# Patient Record
Sex: FEMALE | URBAN - METROPOLITAN AREA
[De-identification: names, ages, dates, MRNs, and addresses within clinical notes are randomized per-mention and may not be internally consistent; named-entity substitution may affect disease eponyms.]

---

## 2024-07-25 VITALS — HEART RATE: 83 BPM | RESPIRATION RATE: 16 BRPM | WEIGHT: 189.82 LBS | HEIGHT: 66 IN

## 2024-07-25 NOTE — ASU PATIENT PROFILE, ADULT - NSICDXPASTMEDICALHX_GEN_ALL_CORE_FT
PAST MEDICAL HISTORY:  Anxiety and depression     History of Graves' disease     IBS (irritable bowel syndrome) IBS-D    Malignant neoplasm of thyroid gland

## 2024-07-26 ENCOUNTER — INPATIENT (INPATIENT)
Facility: HOSPITAL | Age: 24
LOS: 1 days | Discharge: HOME | End: 2024-07-28
Attending: SPECIALIST | Admitting: SPECIALIST
Payer: COMMERCIAL

## 2024-07-26 DIAGNOSIS — Z90.89 ACQUIRED ABSENCE OF OTHER ORGANS: Chronic | ICD-10-CM

## 2024-07-26 LAB
CALCIUM SERPL-MCNC: 8.2 MG/DL — LOW (ref 8.4–10.5)
PTH-INTACT IO % DIF SERPL: 21.5 PG/ML — SIGNIFICANT CHANGE UP (ref 15–65)
PTH-INTACT IO % DIF SERPL: 58.3 PG/ML — SIGNIFICANT CHANGE UP (ref 15–65)

## 2024-07-26 PROCEDURE — 88307 TISSUE EXAM BY PATHOLOGIST: CPT | Mod: 26

## 2024-07-26 PROCEDURE — 60252 REMOVAL OF THYROID: CPT

## 2024-07-26 DEVICE — SURGCEL 4 X 8": Type: IMPLANTABLE DEVICE | Status: FUNCTIONAL

## 2024-07-26 DEVICE — CLIP APPLIER ETHICON LIGACLIP 9 3/8" SMALL: Type: IMPLANTABLE DEVICE | Status: FUNCTIONAL

## 2024-07-26 DEVICE — CLIP APPLIER ETHICON LIGACLIP 11.5" MEDIUM: Type: IMPLANTABLE DEVICE | Status: FUNCTIONAL

## 2024-07-26 RX ORDER — BENZOCAINE AND MENTHOL 5; 1 G/100ML; G/100ML
3 LIQUID ORAL THREE TIMES A DAY
Refills: 0 | Status: DISCONTINUED | OUTPATIENT
Start: 2024-07-26 | End: 2024-07-28

## 2024-07-26 RX ORDER — LEVOTHYROXINE SODIUM 175 MCG
88 TABLET ORAL DAILY
Refills: 0 | Status: DISCONTINUED | OUTPATIENT
Start: 2024-07-27 | End: 2024-07-28

## 2024-07-26 RX ORDER — DEXAMETHASONE 1.5 MG/1
10 TABLET ORAL EVERY 8 HOURS
Refills: 0 | Status: COMPLETED | OUTPATIENT
Start: 2024-07-26 | End: 2024-07-27

## 2024-07-26 RX ORDER — HYDROMORPHONE HCL IN 0.9% NACL 0.2 MG/ML
0.2 PLASTIC BAG, INJECTION (ML) INTRAVENOUS ONCE
Refills: 0 | Status: DISCONTINUED | OUTPATIENT
Start: 2024-07-26 | End: 2024-07-26

## 2024-07-26 RX ORDER — ACETAMINOPHEN 500 MG
1000 TABLET ORAL EVERY 6 HOURS
Refills: 0 | Status: DISCONTINUED | OUTPATIENT
Start: 2024-07-26 | End: 2024-07-28

## 2024-07-26 RX ORDER — ACETAMINOPHEN 500 MG
650 TABLET ORAL EVERY 6 HOURS
Refills: 0 | Status: DISCONTINUED | OUTPATIENT
Start: 2024-07-26 | End: 2024-07-26

## 2024-07-26 RX ORDER — IBUPROFEN 200 MG
600 TABLET ORAL EVERY 6 HOURS
Refills: 0 | Status: DISCONTINUED | OUTPATIENT
Start: 2024-07-26 | End: 2024-07-26

## 2024-07-26 RX ORDER — GUAIFENESIN 100 MG/5ML
600 SOLUTION ORAL EVERY 12 HOURS
Refills: 0 | Status: DISCONTINUED | OUTPATIENT
Start: 2024-07-26 | End: 2024-07-27

## 2024-07-26 RX ORDER — IPRATROPIUM BROMIDE AND ALBUTEROL SULFATE 2.5; .5 MG/3ML; MG/3ML
3 SOLUTION RESPIRATORY (INHALATION) ONCE
Refills: 0 | Status: COMPLETED | OUTPATIENT
Start: 2024-07-26 | End: 2024-07-26

## 2024-07-26 RX ORDER — DEXTROSE MONOHYDRATE, SODIUM CHLORIDE, SODIUM LACTATE, CALCIUM CHLORIDE, MAGNESIUM CHLORIDE 1.5; 538; 448; 18.4; 5.08 G/100ML; MG/100ML; MG/100ML; MG/100ML; MG/100ML
1000 SOLUTION INTRAPERITONEAL
Refills: 0 | Status: DISCONTINUED | OUTPATIENT
Start: 2024-07-26 | End: 2024-07-28

## 2024-07-26 RX ORDER — OXYCODONE HYDROCHLORIDE 30 MG/1
5 TABLET ORAL EVERY 6 HOURS
Refills: 0 | Status: DISCONTINUED | OUTPATIENT
Start: 2024-07-26 | End: 2024-07-26

## 2024-07-26 RX ORDER — ACETAMINOPHEN 500 MG
1000 TABLET ORAL ONCE
Refills: 0 | Status: COMPLETED | OUTPATIENT
Start: 2024-07-26 | End: 2024-07-26

## 2024-07-26 RX ORDER — HYDROMORPHONE HCL IN 0.9% NACL 0.2 MG/ML
0.25 PLASTIC BAG, INJECTION (ML) INTRAVENOUS EVERY 6 HOURS
Refills: 0 | Status: DISCONTINUED | OUTPATIENT
Start: 2024-07-26 | End: 2024-07-27

## 2024-07-26 RX ORDER — HYDROMORPHONE HCL IN 0.9% NACL 0.2 MG/ML
0.5 PLASTIC BAG, INJECTION (ML) INTRAVENOUS ONCE
Refills: 0 | Status: DISCONTINUED | OUTPATIENT
Start: 2024-07-26 | End: 2024-07-26

## 2024-07-26 RX ORDER — DEXTROSE MONOHYDRATE, SODIUM CHLORIDE, SODIUM LACTATE, CALCIUM CHLORIDE, MAGNESIUM CHLORIDE 1.5; 538; 448; 18.4; 5.08 G/100ML; MG/100ML; MG/100ML; MG/100ML; MG/100ML
1000 SOLUTION INTRAPERITONEAL
Refills: 0 | Status: DISCONTINUED | OUTPATIENT
Start: 2024-07-26 | End: 2024-07-26

## 2024-07-26 RX ADMIN — Medication 0.2 MILLIGRAM(S): at 14:45

## 2024-07-26 RX ADMIN — BENZOCAINE AND MENTHOL 3 LOZENGE: 5; 1 LIQUID ORAL at 17:49

## 2024-07-26 RX ADMIN — Medication 0.25 MILLIGRAM(S): at 21:10

## 2024-07-26 RX ADMIN — Medication 1000 MILLIGRAM(S): at 19:22

## 2024-07-26 RX ADMIN — Medication 0.25 MILLIGRAM(S): at 20:40

## 2024-07-26 RX ADMIN — Medication 0.5 MILLIGRAM(S): at 15:46

## 2024-07-26 RX ADMIN — IPRATROPIUM BROMIDE AND ALBUTEROL SULFATE 3 MILLILITER(S): 2.5; .5 SOLUTION RESPIRATORY (INHALATION) at 14:09

## 2024-07-26 RX ADMIN — Medication 400 MILLIGRAM(S): at 18:10

## 2024-07-26 RX ADMIN — BENZOCAINE AND MENTHOL 1 LOZENGE: 5; 1 LIQUID ORAL at 22:30

## 2024-07-26 RX ADMIN — DEXAMETHASONE 102 MILLIGRAM(S): 1.5 TABLET ORAL at 22:28

## 2024-07-26 RX ADMIN — Medication 0.5 MILLIGRAM(S): at 16:00

## 2024-07-26 RX ADMIN — Medication 400 MILLIGRAM(S): at 23:50

## 2024-07-26 RX ADMIN — Medication 0.2 MILLIGRAM(S): at 14:23

## 2024-07-26 RX ADMIN — DEXTROSE MONOHYDRATE, SODIUM CHLORIDE, SODIUM LACTATE, CALCIUM CHLORIDE, MAGNESIUM CHLORIDE 120 MILLILITER(S): 1.5; 538; 448; 18.4; 5.08 SOLUTION INTRAPERITONEAL at 18:10

## 2024-07-26 NOTE — PATIENT PROFILE ADULT - FALL HARM RISK - HARM RISK INTERVENTIONS
Assistance with ambulation/Assistance OOB with selected safe patient handling equipment/Communicate Risk of Fall with Harm to all staff/Monitor gait and stability/Reinforce activity limits and safety measures with patient and family/Sit up slowly, dangle for a short time, stand at bedside before walking/Tailored Fall Risk Interventions/Visual Cue: Yellow wristband and red socks/Bed in lowest position, wheels locked, appropriate side rails in place/Call bell, personal items and telephone in reach/Instruct patient to call for assistance before getting out of bed or chair/Non-slip footwear when patient is out of bed/[place] to call system/Physically safe environment - no spills, clutter or unnecessary equipment/Purposeful Proactive Rounding/Room/bathroom lighting operational, light cord in reach

## 2024-07-26 NOTE — PRE-ANESTHESIA EVALUATION ADULT - NSANTHOSAYNRD_GEN_A_CORE
No. MIGNON screening performed.  STOP BANG Legend: 0-2 = LOW Risk; 3-4 = INTERMEDIATE Risk; 5-8 = HIGH Risk

## 2024-07-26 NOTE — H&P ADULT - HISTORY OF PRESENT ILLNESS
Ms. Aguero is a 23 year old female with history of left thyroid nodule ([place] 3, BRAF+) and Graves disease on methimazole. She underwent total thyroidectomy on 7/26/24 with Dr. Bryant at [facility].     ICU Vital Signs Last 24 Hrs  T(C): 37.2 (27 Jul 2024 04:50), Max: 37.2 (27 Jul 2024 04:50)  T(F): 98.9 (27 Jul 2024 04:50), Max: 98.9 (27 Jul 2024 04:50)  HR: 100 (27 Jul 2024 08:21) (84 - 104)  BP: 137/88 (27 Jul 2024 08:21) (109/55 - 137/88)  BP(mean): 105 (27 Jul 2024 08:21) (75 - 105)  ABP: --  ABP(mean): --  RR: 18 (27 Jul 2024 08:21) (7 - 27)  SpO2: 97% (27 Jul 2024 08:21) (95% - 100%)    O2 Parameters below as of 27 Jul 2024 08:21  Patient On (Oxygen Delivery Method): room air    PE:  General: No acute distress, resting comfortable in bed  Resp: Nonlabored breathing on room air, breathy voice  HEENT: Neck incision clean, dry, intact. No hematoma or focal edema. No erythema or drainage. AMARI drain to bulb suction with minimal serosanguinous output.  Neuro: no numbness or tingiling\    MEDICATIONS  (STANDING):  albuterol/ipratropium for Nebulization. 3 milliLiter(s) Nebulizer once  dexAMETHasone  IVPB 10 milliGRAM(s) IV Intermittent every 8 hours  guaiFENesin  milliGRAM(s) Oral every 12 hours  lactated ringers. 1000 milliLiter(s) (120 mL/Hr) IV Continuous <Continuous>  levothyroxine 88 MICROGram(s) Oral daily  magnesium sulfate  IVPB 2 Gram(s) IV Intermittent once  potassium chloride   Powder 20 milliEquivalent(s) Oral once    MEDICATIONS  (PRN):  acetaminophen   IVPB .. 1000 milliGRAM(s) IV Intermittent every 6 hours PRN Mild Pain (1 - 3)  benzocaine/menthol Lozenge 3 Lozenge Oral three times a day PRN Sore Throat  HYDROmorphone  Injectable 0.25 milliGRAM(s) IV Push every 6 hours PRN Severe Pain (7 - 10)    A/P:  23F PMH thyroid nodule (Fleming 3, BRAF+) and Grave's disease now s/p total thyroidectomy 7/26/24  - Admit overnight for observation  - Pain control as needed  - AMARI x2 neck, management  - Bedside swallow assessment prior to starting diet  - Decadron 10q8  - Trend calcium

## 2024-07-26 NOTE — BRIEF OPERATIVE NOTE - NSICDXBRIEFPREOP_GEN_ALL_CORE_FT
PRE-OP DIAGNOSIS:  Graves disease 26-Jul-2024 13:31:43  Daniel Armando  Thyroid nodule 26-Jul-2024 13:32:06  Daniel Armando

## 2024-07-26 NOTE — BRIEF OPERATIVE NOTE - NSICDXBRIEFPOSTOP_GEN_ALL_CORE_FT
POST-OP DIAGNOSIS:  Graves disease 26-Jul-2024 13:31:51  Daniel Armando  Thyroid nodule 26-Jul-2024 13:31:58  Daniel Armando

## 2024-07-26 NOTE — PATIENT PROFILE ADULT - HISTORY OF COVID-19 VACCINATION
Paternal Grandmother     Breast Cancer Paternal Grandmother     High Blood Pressure Paternal Grandfather      Social History     Socioeconomic History    Marital status: Single     Spouse name: Not on file    Number of children: Not on file    Years of education: Not on file    Highest education level: Not on file   Occupational History    Occupation: student      Comment: charles    Tobacco Use    Smoking status: Never Smoker    Smokeless tobacco: Never Used   Vaping Use    Vaping Use: Never used   Substance and Sexual Activity    Alcohol use: No    Drug use: No    Sexual activity: Not on file   Other Topics Concern    Not on file   Social History Narrative    Not on file     Social Determinants of Health     Financial Resource Strain:     Difficulty of Paying Living Expenses:    Food Insecurity:     Worried About Running Out of Food in the Last Year:     [address] in the Last Year:    Transportation Needs:     Lack of Transportation (Medical):  Lack of Transportation (Non-Medical):    Physical Activity:     Days of Exercise per Week:     Minutes of Exercise per Session:    Stress:     Feeling of Stress :    Social Connections:     Frequency of Communication with Friends and Family:     Frequency of Social Gatherings with Friends and Family:     Attends [employer] Services:     Active Member of Clubs or Organizations:     Attends Club or Organization Meetings:     Marital Status:    Intimate Partner Violence:     Fear of Current or Ex-Partner:     Emotionally Abused:     Physically Abused:     Sexually Abused:        Review of Systems   Constitutional: Negative for chills and fever. Eyes: Negative for visual disturbance. Respiratory: Negative for cough, chest tightness, shortness of breath and wheezing. Cardiovascular: Negative for chest pain, palpitations and leg swelling. Gastrointestinal: Negative for abdominal pain, constipation, diarrhea, nausea and vomiting. Musculoskeletal: Negative for arthralgias and myalgias. Skin: Negative for rash. Neurological: Negative for headaches. Psychiatric/Behavioral: Negative for dysphoric mood, self-injury, sleep disturbance and suicidal ideas. The patient is nervous/anxious. OBJECTIVE:    Physical Exam  Vitals and nursing note reviewed. Constitutional:       General: She is not in acute distress. Appearance: Normal appearance. She is not ill-appearing, toxic-appearing or diaphoretic. HENT:      Head: Normocephalic and atraumatic. Right Ear: External ear normal.      Left Ear: External ear normal.      Nose: Nose normal.      Mouth/Throat:      Mouth: Mucous membranes are moist.   Eyes:      Extraocular Movements: Extraocular movements intact. Conjunctiva/sclera: Conjunctivae normal.      Pupils: Pupils are equal, round, and reactive to light. Pulmonary:      Effort: Pulmonary effort is normal. No respiratory distress. Musculoskeletal:         General: Normal range of motion. Cervical back: Normal range of motion. Skin:     General: Skin is dry. Coloration: Skin is not pale. Findings: No rash. Neurological:      Mental Status: She is alert and oriented to person, place, and time. Psychiatric:         Mood and Affect: Mood normal.         Behavior: Behavior normal.       LMP 08/10/2021    BP Readings from Last 3 Encounters:   08/17/21 120/80   02/16/21 100/80   02/04/21 100/75      Wt Readings from Last 3 Encounters:   08/17/21 172 lb 12.8 oz (78.4 kg) (93 %, Z= 1.51)*   02/16/21 171 lb (77.6 kg) (93 %, Z= 1.51)*   02/04/21 169 lb 9.6 oz (76.9 kg) (93 %, Z= 1.48)*     * Growth percentiles are based on CDC (Girls, 2-20 Years) data. ASSESSMENT & PLAN:    1. Pre-diabetes  - Stable, continue life style modifications  - Reviewed diabetic diet    2. Dysmenorrhea in the adolescent  - Stable, continue current regimen     3.  Mild intermittent reactive airway disease with wheezing without complication  - Stable, continue current albuterol as needed/directed     4. Seasonal allergic rhinitis due to pollen  - Stable, continue Claritin 10 mg daily     5. Anxiety and depression  - Increase Zoloft for 50 mg to 75 mg daily   - sertraline (ZOLOFT) 50 MG tablet; Take 1.5 tablets by mouth daily  Dispense: 45 tablet; Refill: 2    6. Panic attack  - Increase Zoloft for 50 mg to 75 mg daily   - sertraline (ZOLOFT) 50 MG tablet; Take 1.5 tablets by mouth daily  Dispense: 45 tablet; Refill: 2    7. Insomnia, unspecified type  - Stable, continue lifestyle modifications  - Reviewed sleep health/hygeine       Continue current treatment plan. Current Outpatient Medications   Medication Sig Dispense Refill    sertraline (ZOLOFT) 50 MG tablet Take 1.5 tablets by mouth daily 45 tablet 2    norgestimate-ethinyl estradiol (ORTHO-CYCLEN, 28,) 0.25-35 MG-MCG per tablet Take 1 tablet by mouth daily 1 packet 2    loratadine (CLARITIN) 10 MG capsule Take 10 mg by mouth daily      albuterol sulfate HFA (PROAIR HFA) 108 (90 Base) MCG/ACT inhaler Inhale 2 puffs into the lungs every 6 hours as needed for Wheezing or Shortness of Breath 1 Inhaler 2     No current facility-administered medications for this visit. Return in 3 months (on 11/16/2021), or if symptoms worsen or fail to improve, for pre- diabetes, dysmenorrhea, asthma, AR, anxiety, depression, insomnia. [place] received counseling on the following healthy behaviors: nutrition, exercise and medication adherence    Discussed use, benefit, and side effects of prescribed medications. Barriers to medication compliance addressed. All patient questions answered. Pt voiced understanding. Call office if experience side effects from medications. Please note that some or all of this record was generated using voice recognition software.  If there are any questions about the content of this document, please contact the author as some errors in transcription may have occurred. Chinyere Ana was evaluated through a synchronous (real-time) audio-video encounter. The patient (or guardian if applicable) is aware that this is a billable service. Verbal consent to proceed has been obtained within the past 12 months. The visit was conducted pursuant to the emergency declaration under the [address] [place] [address], [address] [place] [address] and the [employer] and [employer] General Act. Patient identification was verified, and a caregiver was present when appropriate. The patient was located in a state where the provider was credentialed to provide care. Total time spent for this encounter: Not billed by time    --JEB Agee CNP on 8/25/2021 at 5:51 PM    An electronic signature was used to authenticate this note. Yes

## 2024-07-27 LAB
ALBUMIN SERPL ELPH-MCNC: 4 G/DL — SIGNIFICANT CHANGE UP (ref 3.3–5)
ALP SERPL-CCNC: 141 U/L — HIGH (ref 40–120)
ALT FLD-CCNC: 10 U/L — SIGNIFICANT CHANGE UP (ref 10–45)
ANION GAP SERPL CALC-SCNC: 13 MMOL/L — SIGNIFICANT CHANGE UP (ref 5–17)
AST SERPL-CCNC: 39 U/L — SIGNIFICANT CHANGE UP (ref 10–40)
BILIRUB SERPL-MCNC: 0.6 MG/DL — SIGNIFICANT CHANGE UP (ref 0.2–1.2)
BUN SERPL-MCNC: 9 MG/DL — SIGNIFICANT CHANGE UP (ref 7–23)
CALCIUM SERPL-MCNC: 8.1 MG/DL — LOW (ref 8.4–10.5)
CHLORIDE SERPL-SCNC: 102 MMOL/L — SIGNIFICANT CHANGE UP (ref 96–108)
CO2 SERPL-SCNC: 23 MMOL/L — SIGNIFICANT CHANGE UP (ref 22–31)
CREAT SERPL-MCNC: 0.77 MG/DL — SIGNIFICANT CHANGE UP (ref 0.5–1.3)
EGFR: 112 ML/MIN/1.73M2 — SIGNIFICANT CHANGE UP
GLUCOSE SERPL-MCNC: 155 MG/DL — HIGH (ref 70–99)
HCT VFR BLD CALC: 38.5 % — SIGNIFICANT CHANGE UP (ref 34.5–45)
HGB BLD-MCNC: 12.6 G/DL — SIGNIFICANT CHANGE UP (ref 11.5–15.5)
MAGNESIUM SERPL-MCNC: 1.8 MG/DL — SIGNIFICANT CHANGE UP (ref 1.6–2.6)
MCHC RBC-ENTMCNC: 27.2 PG — SIGNIFICANT CHANGE UP (ref 27–34)
MCHC RBC-ENTMCNC: 32.7 GM/DL — SIGNIFICANT CHANGE UP (ref 32–36)
MCV RBC AUTO: 83 FL — SIGNIFICANT CHANGE UP (ref 80–100)
NRBC # BLD: 0 /100 WBCS — SIGNIFICANT CHANGE UP (ref 0–0)
PHOSPHATE SERPL-MCNC: 3.6 MG/DL — SIGNIFICANT CHANGE UP (ref 2.5–4.5)
PLATELET # BLD AUTO: 294 K/UL — SIGNIFICANT CHANGE UP (ref 150–400)
POTASSIUM SERPL-MCNC: 3.7 MMOL/L — SIGNIFICANT CHANGE UP (ref 3.5–5.3)
POTASSIUM SERPL-SCNC: 3.7 MMOL/L — SIGNIFICANT CHANGE UP (ref 3.5–5.3)
PROT SERPL-MCNC: 7.1 G/DL — SIGNIFICANT CHANGE UP (ref 6–8.3)
RBC # BLD: 4.64 M/UL — SIGNIFICANT CHANGE UP (ref 3.8–5.2)
RBC # FLD: 15.4 % — HIGH (ref 10.3–14.5)
SODIUM SERPL-SCNC: 138 MMOL/L — SIGNIFICANT CHANGE UP (ref 135–145)
WBC # BLD: 18.46 K/UL — HIGH (ref 3.8–10.5)
WBC # FLD AUTO: 18.46 K/UL — HIGH (ref 3.8–10.5)

## 2024-07-27 RX ORDER — HYDROMORPHONE HCL IN 0.9% NACL 0.2 MG/ML
0.5 PLASTIC BAG, INJECTION (ML) INTRAVENOUS EVERY 6 HOURS
Refills: 0 | Status: DISCONTINUED | OUTPATIENT
Start: 2024-07-27 | End: 2024-07-28

## 2024-07-27 RX ORDER — HYDROMORPHONE HCL IN 0.9% NACL 0.2 MG/ML
0.25 PLASTIC BAG, INJECTION (ML) INTRAVENOUS EVERY 6 HOURS
Refills: 0 | Status: DISCONTINUED | OUTPATIENT
Start: 2024-07-27 | End: 2024-07-28

## 2024-07-27 RX ORDER — ACETYLCYSTEINE
4 POWDER (GRAM) MISCELLANEOUS ONCE
Refills: 0 | Status: COMPLETED | OUTPATIENT
Start: 2024-07-27 | End: 2024-07-27

## 2024-07-27 RX ORDER — POTASSIUM CHLORIDE 1500 MG/1
20 TABLET, EXTENDED RELEASE ORAL ONCE
Refills: 0 | Status: COMPLETED | OUTPATIENT
Start: 2024-07-27 | End: 2024-07-27

## 2024-07-27 RX ORDER — GUAIFENESIN 100 MG/5ML
100 SOLUTION ORAL EVERY 6 HOURS
Refills: 0 | Status: DISCONTINUED | OUTPATIENT
Start: 2024-07-27 | End: 2024-07-28

## 2024-07-27 RX ORDER — HYDROMORPHONE HCL IN 0.9% NACL 0.2 MG/ML
0.5 PLASTIC BAG, INJECTION (ML) INTRAVENOUS ONCE
Refills: 0 | Status: DISCONTINUED | OUTPATIENT
Start: 2024-07-27 | End: 2024-07-27

## 2024-07-27 RX ORDER — MAGNESIUM SULFATE 500 MG/ML
2 VIAL (ML) INJECTION ONCE
Refills: 0 | Status: COMPLETED | OUTPATIENT
Start: 2024-07-27 | End: 2024-07-27

## 2024-07-27 RX ORDER — IPRATROPIUM BROMIDE AND ALBUTEROL SULFATE 2.5; .5 MG/3ML; MG/3ML
3 SOLUTION RESPIRATORY (INHALATION) ONCE
Refills: 0 | Status: COMPLETED | OUTPATIENT
Start: 2024-07-27 | End: 2024-07-27

## 2024-07-27 RX ADMIN — Medication 400 MILLIGRAM(S): at 05:50

## 2024-07-27 RX ADMIN — POTASSIUM CHLORIDE 20 MILLIEQUIVALENT(S): 1500 TABLET, EXTENDED RELEASE ORAL at 10:46

## 2024-07-27 RX ADMIN — Medication 0.5 MILLIGRAM(S): at 11:35

## 2024-07-27 RX ADMIN — Medication 1000 MILLIGRAM(S): at 00:20

## 2024-07-27 RX ADMIN — DEXAMETHASONE 102 MILLIGRAM(S): 1.5 TABLET ORAL at 13:58

## 2024-07-27 RX ADMIN — DEXAMETHASONE 102 MILLIGRAM(S): 1.5 TABLET ORAL at 05:00

## 2024-07-27 RX ADMIN — Medication 0.25 MILLIGRAM(S): at 09:38

## 2024-07-27 RX ADMIN — Medication 1000 MILLIGRAM(S): at 06:20

## 2024-07-27 RX ADMIN — Medication 0.25 MILLIGRAM(S): at 08:49

## 2024-07-27 RX ADMIN — Medication 25 GRAM(S): at 10:46

## 2024-07-27 RX ADMIN — DEXTROSE MONOHYDRATE, SODIUM CHLORIDE, SODIUM LACTATE, CALCIUM CHLORIDE, MAGNESIUM CHLORIDE 120 MILLILITER(S): 1.5; 538; 448; 18.4; 5.08 SOLUTION INTRAPERITONEAL at 05:48

## 2024-07-27 RX ADMIN — Medication 0.5 MILLIGRAM(S): at 10:51

## 2024-07-27 RX ADMIN — DEXTROSE MONOHYDRATE, SODIUM CHLORIDE, SODIUM LACTATE, CALCIUM CHLORIDE, MAGNESIUM CHLORIDE 120 MILLILITER(S): 1.5; 538; 448; 18.4; 5.08 SOLUTION INTRAPERITONEAL at 05:00

## 2024-07-27 RX ADMIN — IPRATROPIUM BROMIDE AND ALBUTEROL SULFATE 3 MILLILITER(S): 2.5; .5 SOLUTION RESPIRATORY (INHALATION) at 10:46

## 2024-07-27 RX ADMIN — Medication 400 MILLIGRAM(S): at 16:32

## 2024-07-27 RX ADMIN — Medication 4 MILLILITER(S): at 10:51

## 2024-07-27 RX ADMIN — Medication 1000 MILLIGRAM(S): at 18:43

## 2024-07-27 RX ADMIN — Medication 0.5 MILLIGRAM(S): at 20:17

## 2024-07-27 RX ADMIN — Medication 0.5 MILLIGRAM(S): at 20:35

## 2024-07-28 VITALS
DIASTOLIC BLOOD PRESSURE: 79 MMHG | RESPIRATION RATE: 18 BRPM | OXYGEN SATURATION: 97 % | SYSTOLIC BLOOD PRESSURE: 128 MMHG | HEART RATE: 84 BPM

## 2024-07-28 LAB
ALBUMIN SERPL ELPH-MCNC: 3.9 G/DL — SIGNIFICANT CHANGE UP (ref 3.3–5)
ALP SERPL-CCNC: 145 U/L — HIGH (ref 40–120)
ALT FLD-CCNC: 10 U/L — SIGNIFICANT CHANGE UP (ref 10–45)
ANION GAP SERPL CALC-SCNC: 11 MMOL/L — SIGNIFICANT CHANGE UP (ref 5–17)
AST SERPL-CCNC: 28 U/L — SIGNIFICANT CHANGE UP (ref 10–40)
BILIRUB SERPL-MCNC: 0.6 MG/DL — SIGNIFICANT CHANGE UP (ref 0.2–1.2)
BUN SERPL-MCNC: 13 MG/DL — SIGNIFICANT CHANGE UP (ref 7–23)
CALCIUM SERPL-MCNC: 9 MG/DL — SIGNIFICANT CHANGE UP (ref 8.4–10.5)
CHLORIDE SERPL-SCNC: 105 MMOL/L — SIGNIFICANT CHANGE UP (ref 96–108)
CO2 SERPL-SCNC: 27 MMOL/L — SIGNIFICANT CHANGE UP (ref 22–31)
CREAT SERPL-MCNC: 0.8 MG/DL — SIGNIFICANT CHANGE UP (ref 0.5–1.3)
EGFR: 107 ML/MIN/1.73M2 — SIGNIFICANT CHANGE UP
GLUCOSE SERPL-MCNC: 98 MG/DL — SIGNIFICANT CHANGE UP (ref 70–99)
POTASSIUM SERPL-MCNC: 4.1 MMOL/L — SIGNIFICANT CHANGE UP (ref 3.5–5.3)
POTASSIUM SERPL-SCNC: 4.1 MMOL/L — SIGNIFICANT CHANGE UP (ref 3.5–5.3)
PROT SERPL-MCNC: 7.1 G/DL — SIGNIFICANT CHANGE UP (ref 6–8.3)
SODIUM SERPL-SCNC: 143 MMOL/L — SIGNIFICANT CHANGE UP (ref 135–145)

## 2024-07-28 PROCEDURE — 83735 ASSAY OF MAGNESIUM: CPT

## 2024-07-28 PROCEDURE — 94640 AIRWAY INHALATION TREATMENT: CPT

## 2024-07-28 PROCEDURE — 80053 COMPREHEN METABOLIC PANEL: CPT

## 2024-07-28 PROCEDURE — 36415 COLL VENOUS BLD VENIPUNCTURE: CPT

## 2024-07-28 PROCEDURE — 83970 ASSAY OF PARATHORMONE: CPT

## 2024-07-28 PROCEDURE — 84100 ASSAY OF PHOSPHORUS: CPT

## 2024-07-28 PROCEDURE — 82310 ASSAY OF CALCIUM: CPT

## 2024-07-28 PROCEDURE — C1889: CPT

## 2024-07-28 PROCEDURE — 85027 COMPLETE CBC AUTOMATED: CPT

## 2024-07-28 RX ORDER — IBUPROFEN 200 MG
1 TABLET ORAL
Qty: 48 | Refills: 0
Start: 2024-07-28 | End: 2024-08-08

## 2024-07-28 RX ORDER — DEXAMETHASONE 1.5 MG/1
10 TABLET ORAL EVERY 8 HOURS
Refills: 0 | Status: DISCONTINUED | OUTPATIENT
Start: 2024-07-28 | End: 2024-07-28

## 2024-07-28 RX ORDER — LEVOTHYROXINE SODIUM 175 MCG
1 TABLET ORAL
Qty: 30 | Refills: 3
Start: 2024-07-28 | End: 2024-11-24

## 2024-07-28 RX ORDER — METHIMAZOLE 5 MG
1 TABLET ORAL
Refills: 0 | DISCHARGE

## 2024-07-28 RX ORDER — ACETAMINOPHEN 500 MG
2 TABLET ORAL
Qty: 96 | Refills: 0
Start: 2024-07-28 | End: 2024-08-08

## 2024-07-28 RX ORDER — ACETYLCYSTEINE
4 POWDER (GRAM) MISCELLANEOUS ONCE
Refills: 0 | Status: COMPLETED | OUTPATIENT
Start: 2024-07-28 | End: 2024-07-28

## 2024-07-28 RX ADMIN — Medication 0.5 MILLIGRAM(S): at 09:27

## 2024-07-28 RX ADMIN — Medication 0.5 MILLIGRAM(S): at 09:45

## 2024-07-28 RX ADMIN — DEXTROSE MONOHYDRATE, SODIUM CHLORIDE, SODIUM LACTATE, CALCIUM CHLORIDE, MAGNESIUM CHLORIDE 120 MILLILITER(S): 1.5; 538; 448; 18.4; 5.08 SOLUTION INTRAPERITONEAL at 05:36

## 2024-07-28 RX ADMIN — Medication 4 MILLILITER(S): at 09:26

## 2024-07-28 RX ADMIN — DEXAMETHASONE 102 MILLIGRAM(S): 1.5 TABLET ORAL at 10:44

## 2024-07-28 RX ADMIN — Medication 88 MICROGRAM(S): at 05:35

## 2024-07-28 NOTE — DISCHARGE NOTE NURSING/CASE MANAGEMENT/SOCIAL WORK - PATIENT PORTAL LINK FT
You can access the FollowMyHealth Patient Portal offered by [facility] by registering at the following website: [email or web address]. By joining [employer]’s FollowMyHealth portal, you will also be able to view your health information using other applications (apps) compatible with our system.

## 2024-07-28 NOTE — PROGRESS NOTE ADULT - SUBJECTIVE AND OBJECTIVE BOX
OTOLARYNGOLOGY (ENT) PROGRESS NOTE    PATIENT: ELVIA MARIE  MRN: [id number]  : [birth year]  [id number]  DATE OF SERVICE:  24  			         ID:ELVIA MARIE is a  23yFemale s/p TT for Graves and [place] 3 nodule .    Subjective/ Interval:   : Patient seen and examined at bedside. Afebrile, coughing overnight with difficulty clearing mucus. Difficulty swallowing yesterday with pain, remains NPO with IVF. Receiving decadron 10q8. Neck soft, flat.    ALLERGIES:  No Known Allergies      MEDICATIONS:  Antiinfectives:     IV fluids:  lactated ringers. 1000 milliLiter(s) IV Continuous <Continuous>  magnesium sulfate  IVPB 2 Gram(s) IV Intermittent once  potassium chloride   Powder 20 milliEquivalent(s) Oral once    Hematologic/Anticoagulation:    Pain medications/Neuro:  acetaminophen   IVPB .. 1000 milliGRAM(s) IV Intermittent every 6 hours PRN  HYDROmorphone  Injectable 0.25 milliGRAM(s) IV Push every 6 hours PRN    Endocrine Medications:   dexAMETHasone  IVPB 10 milliGRAM(s) IV Intermittent every 8 hours  levothyroxine 88 MICROGram(s) Oral daily    All other standing medications:   albuterol/ipratropium for Nebulization. 3 milliLiter(s) Nebulizer once  guaiFENesin  milliGRAM(s) Oral every 12 hours    All other PRN medications:  benzocaine/menthol Lozenge 3 Lozenge Oral three times a day PRN    Vital Signs Last 24 Hrs  T(C): 37.2 (2024 04:50), Max: 37.2 (2024 04:50)  T(F): 98.9 (2024 04:50), Max: 98.9 (2024 04:50)  HR: 100 (2024 08:21) (84 - 104)  BP: 137/88 (2024 08:21) (109/55 - 137/88)  BP(mean): 105 (2024 08:21) (75 - 105)  RR: 18 (2024 08:21) (7 - 27)  SpO2: 97% (2024 08:21) (95% - 100%)    Parameters below as of 2024 08:21  Patient On (Oxygen Delivery Method): room air           @ 07:  -   @ 07:00  --------------------------------------------------------  IN:    Lactated Ringers: 300 mL    Lactated Ringers: 1560 mL  Total IN: 1860 mL    OUT:    Bulb (mL): 20 mL    Bulb (mL): 15 mL    Voided (mL): 1000 mL  Total OUT: 1035 mL    Total NET: 825 mL          24 @ 07:01  -  24 @ 07:00  --------------------------------------------------------  IN:  Total IN: 0 mL    OUT:    Bulb (mL): 20 mL    Bulb (mL): 15 mL  Total OUT: 35 mL    Total NET: -35 mL              PHYSICAL EXAM:  General: NAD, A+Ox3  Respiratory: No respiratory distress, stridor, or stertor  Voice quality: hoarse   OU: EOMI  Right: Pinna wnl  Left: Pinna wnl  Nose: nasal cavity clear bilaterally  OC/OP: tongue normal, floor of mouth WNL, no masses or lesions, OP clear  Neck: soft/flat, no LAD, AMARI x2 ss       LABS                       12.6   18.46 )-----------( 294      ( 2024 06:12 )             38.5        138  |  102  |  9   ----------------------------<  155<H>  3.7   |  23  |  0.77    Ca    8.1<L>      2024 06:12  Phos  3.6       Mg     1.8         TPro  7.1  /  Alb  4.0  /  TBili  0.6  /  DBili  x   /  AST  39  /  ALT  10  /  AlkPhos  141<H>           Coagulation Studies-     Urinalysis Basic - ( 2024 06:12 )    Color: x / Appearance: x / SG: x / pH: x  Gluc: 155 mg/dL / Ketone: x  / Bili: x / Urobili: x   Blood: x / Protein: x / Nitrite: x   Leuk Esterase: x / RBC: x / WBC x   Sq Epi: x / Non Sq Epi: x / Bacteria: x      Endocrine Panel-  Calcium: 8.1 mg/dL ( @ 06:12)  Calcium: 8.2 mg/dL ( @ 19:29)    PTH Intact, Intraoperative.: 21.5 pg/mL ( @ 14:13)              MICROBIOLOGY:      
OTOLARYNGOLOGY (ENT) PROGRESS NOTE    PATIENT: ELVIA MARIE  MRN: [id number]  : [birth year]  [id number]  DATE OF SERVICE:  24  			         ID:ELVIA MARIE is a  23yFemale s/p TT for Graves and [place] 3 nodule .    Subjective/ Interval:   : Patient seen and examined at bedside. Afebrile, coughing overnight with difficulty clearing mucus. Difficulty swallowing yesterday with pain, remains NPO with IVF. Receiving decadron 10q8. Neck soft, flat.  : Patient seen and examined at bedside. AVSS, NAEON. Tolerating breakfast. On decadron 10q8. Neck soft, flat. Both JPs removed.    ALLERGIES:  No Known Allergies      MEDICATIONS:  Antiinfectives:     IV fluids:  lactated ringers. 1000 milliLiter(s) IV Continuous <Continuous>    Hematologic/Anticoagulation:    Pain medications/Neuro:  acetaminophen   IVPB .. 1000 milliGRAM(s) IV Intermittent every 6 hours PRN  HYDROmorphone  Injectable 0.25 milliGRAM(s) IV Push every 6 hours PRN  HYDROmorphone  Injectable 0.5 milliGRAM(s) IV Push every 6 hours PRN    Endocrine Medications:   dexAMETHasone  IVPB 10 milliGRAM(s) IV Intermittent every 8 hours  levothyroxine 88 MICROGram(s) Oral daily    All other standing medications:     All other PRN medications:  benzocaine/menthol Lozenge 3 Lozenge Oral three times a day PRN  guaiFENesin Oral Liquid (Sugar-Free) 100 milliGRAM(s) Oral every 6 hours PRN    Vital Signs Last 24 Hrs  T(C): 36.9 (2024 04:58), Max: 36.9 (2024 21:51)  T(F): 98.5 (2024 04:58), Max: 98.5 (2024 04:58)  HR: 88 (2024 08:10) (72 - 100)  BP: 128/81 (2024 08:10) (122/80 - 142/78)  BP(mean): 100 (2024 08:10) (96 - 104)  RR: 18 (2024 08:10) (17 - 18)  SpO2: 97% (2024 08:10) (97% - 98%)    Parameters below as of 2024 08:10  Patient On (Oxygen Delivery Method): room air           @ 07:01  -   @ 07:00  --------------------------------------------------------  IN:    Lactated Ringers: 2640 mL  Total IN: 2640 mL    OUT:    Bulb (mL): 33 mL    Voided (mL): 1900 mL  Total OUT: 1933 mL    Total NET: 707 mL       @ 07:  -   @ 09:44  --------------------------------------------------------  IN:    Lactated Ringers: 120 mL  Total IN: 120 mL    OUT:    Bulb (mL): 5 mL    Voided (mL): 500 mL  Total OUT: 505 mL    Total NET: -385 mL          24 @ 07:01  -  24 @ 07:00  --------------------------------------------------------  IN:  Total IN: 0 mL    OUT:    Bulb (mL): 33 mL  Total OUT: 33 mL    Total NET: -33 mL      24 @ 07:01  -  24 @ 09:44  --------------------------------------------------------  IN:  Total IN: 0 mL    OUT:    Bulb (mL): 5 mL  Total OUT: 5 mL    Total NET: -5 mL              PHYSICAL EXAM:  General: NAD, A+Ox3  Respiratory: No respiratory distress, stridor, or stertor  Voice quality: hoarse   OU: EOMI  Right: Pinna wnl  Left: Pinna wnl  Nose: nasal cavity clear bilaterally  OC/OP: tongue normal, floor of mouth WNL, no masses or lesions, OP clear  Neck: soft/flat, no LAD, AMARI x2 ss       LABS                       12.6   18.46 )-----------( 294      ( 2024 06:12 )             38.5        143  |  105  |  13  ----------------------------<  98  4.1   |  27  |  0.80    Ca    9.0      2024 05:30  Phos  3.6       Mg     1.8         TPro  7.1  /  Alb  3.9  /  TBili  0.6  /  DBili  x   /  AST    /  ALT  10  /  AlkPhos  145<H>           Coagulation Studies-     Urinalysis Basic - ( 2024 05:30 )    Color: x / Appearance: x / SG: x / pH: x  Gluc: 98 mg/dL / Ketone: x  / Bili: x / Urobili: x   Blood: x / Protein: x / Nitrite: x   Leuk Esterase: x / RBC: x / WBC x   Sq Epi: x / Non Sq Epi: x / Bacteria: x      Endocrine Panel-  Calcium: 9.0 mg/dL ( @ 05:30)

## 2024-07-28 NOTE — PROGRESS NOTE ADULT - ASSESSMENT
ELVIA MARIE is a  23yFemale   s/p TT for Graves and [place] 3 nodule 7/26.    PLAN:  - decadron 10q8  - f/u diet  - synthroid  - pain control    Disposition:   [facility] at [phone] with any questions/concerns.
ELVIA MARIE is a  23yFemale   s/p TT for Graves and [place] 3 nodule 7/26.    PLAN:  - decadron 10q8  - monitor AMARI output  - NPO/IVF pending discussion with attending   - synthroid  - pain control    Disposition:   [facility] at [phone] with any questions/concerns.

## 2024-07-28 NOTE — DISCHARGE NOTE PROVIDER - HOSPITAL COURSE
OTOLARYNGOLOGY (ENT) DISCHARGE SUMMARY    PATIENT: ELVIA MARIE               : [birth year]  MRN: [id number]  ADMISSION DATE: 24  Discharge Date: 24 @ 09:46  Attending Physician: Garth Bryant    Admission Diagnosis:  C73        Status post:Total thyroidectomy         Chronic Conditions:  Malignant neoplasm of thyroid gland    IBS (irritable bowel syndrome)    Anxiety and depression    History of Graves' disease    HPI:ELVIA MARIE  is a 23yFemale s/p TT for Graves and [place] 3 nodule .    Brief Hospital Course:    : Patient seen and examined at bedside. Afebrile, coughing overnight with difficulty clearing mucus. Difficulty swallowing yesterday with pain, remains NPO with IVF. Receiving decadron 10q8. Neck soft, flat.  : Patient seen and examined at bedside. AVSS, NAEON. Tolerating breakfast. On decadron 10q8. Neck soft, flat. Both JPs removed.    Disposition: Home with family.****    Discharge Condition: Stable

## 2024-07-28 NOTE — DISCHARGE NOTE PROVIDER - NSDCFUSCHEDAPPT_GEN_ALL_CORE_FT
Daren Mendoza  [facility]  [facility] [address]  Scheduled Appointment: 07/30/2024

## 2024-07-28 NOTE — DISCHARGE NOTE NURSING/CASE MANAGEMENT/SOCIAL WORK - NSDCPEFALRISK_GEN_ALL_CORE
For information on Fall & Injury Prevention, visit: [email or web address] OR  [email or web address] OR  https://www.cdc.gov/steadi/patient.html

## 2024-07-28 NOTE — DISCHARGE NOTE PROVIDER - NSDCFUADDINST_GEN_ALL_CORE_FT
Please follow up with Dr. Bryant in clinic tomorrow or Tuesday. Call office to confirm appointment.  Discontinue methimazole. Take synthroid 88mcg every morning.     Please call you surgeon if you experience any numbness or tingling around your mouth, in your fingers or toes, or anywhere. This may be a sign of low blood calcium levels. If you have muscle cramping and or curling of your fingers or toes, this could be even more seriously low blood calcium levels. This can be a life-threatening problem and you must seek medical attention immediately. Take 2 tablets of calcium (TUMS antacid) when this happens. You should not drive if you are having these symptoms.     Continue taking thyroid supplement (synthroid) daily.     You should also contact your surgeon if you have fever over 101.5 degrees F, foul smelling discharge from your incision, a large amount of bleeding, more than expected swelling of your neck, an increase warmth or redness around the incision, worsening pain, or choking/coughing with food or liquid.

## 2024-07-28 NOTE — DISCHARGE NOTE PROVIDER - CARE PROVIDER_API CALL
BP well controlled, continue lisinopril-hctz, refill given  Patient has dry cough, may be related to lisinopril but also could be related to life long smoking history, discussed switching medications, patient declines at this time  Repeat CMP ordered to check on electrolytes as not completed in January    
Chronic back pain without red flags on history or exam  Has never tried PT, will give referral  Discussed prolonged use of high dose NSAIDs is not recommended, discussed tylenol 1000mg TID for pain prn  Follow up in 1-2 months after a few PT visits or sooner prn  
Garth Bryant  Otolaryngology  [address] [place] [address], [address]  [place] [address]  Phone: [phone]  Fax: [phone]  Follow Up Time:

## 2024-07-29 PROBLEM — C73 MALIGNANT NEOPLASM OF THYROID GLAND: Chronic | Status: ACTIVE | Noted: 2024-07-25

## 2024-07-29 PROBLEM — K58.9 IRRITABLE BOWEL SYNDROME WITHOUT DIARRHEA: Chronic | Status: ACTIVE | Noted: 2024-07-25

## 2024-07-29 PROBLEM — K58.9 IRRITABLE BOWEL SYNDROME, UNSPECIFIED: Chronic | Status: ACTIVE | Noted: 2024-07-25

## 2024-07-29 PROBLEM — F41.9 ANXIETY DISORDER, UNSPECIFIED: Chronic | Status: ACTIVE | Noted: 2024-07-25

## 2024-07-29 PROBLEM — Z86.39 PERSONAL HISTORY OF OTHER ENDOCRINE, NUTRITIONAL AND METABOLIC DISEASE: Chronic | Status: ACTIVE | Noted: 2024-07-25

## 2024-08-02 LAB — SURGICAL PATHOLOGY STUDY: SIGNIFICANT CHANGE UP

## 2024-08-05 DIAGNOSIS — E05.00 THYROTOXICOSIS WITH DIFFUSE GOITER WITHOUT THYROTOXIC CRISIS OR STORM: ICD-10-CM

## 2024-08-05 DIAGNOSIS — F32.A DEPRESSION, UNSPECIFIED: ICD-10-CM

## 2024-08-05 DIAGNOSIS — F41.9 ANXIETY DISORDER, UNSPECIFIED: ICD-10-CM

## 2024-08-05 DIAGNOSIS — Z79.890 HORMONE REPLACEMENT THERAPY: ICD-10-CM

## 2024-08-05 DIAGNOSIS — C73 MALIGNANT NEOPLASM OF THYROID GLAND: ICD-10-CM

## 2024-08-05 DIAGNOSIS — K58.9 IRRITABLE BOWEL SYNDROME WITHOUT DIARRHEA: ICD-10-CM

## 2024-08-09 PROBLEM — E89.0 POST-SURGICAL HYPOTHYROIDISM: Status: ACTIVE | Noted: 2024-08-05

## 2024-08-09 PROBLEM — Z92.89 HOSPITALIZATION WITHIN LAST 30 DAYS: Status: ACTIVE | Noted: 2024-08-09

## 2024-08-09 PROBLEM — Z02.9 ADMINISTRATIVE ENCOUNTER: Status: ACTIVE | Noted: 2024-08-09

## 2024-08-21 PROBLEM — R06.02 SOB (SHORTNESS OF BREATH) ON EXERTION: Status: ACTIVE | Noted: 2024-08-21

## 2024-08-21 PROBLEM — C73 THYROID CANCER: Status: ACTIVE | Noted: 2024-08-21

## 2024-10-14 ENCOUNTER — APPOINTMENT (OUTPATIENT)
Dept: OTOLARYNGOLOGY | Facility: CLINIC | Age: 24
End: 2024-10-14
Payer: MEDICAID

## 2024-10-14 VITALS
BODY MASS INDEX: 29.41 KG/M2 | SYSTOLIC BLOOD PRESSURE: 126 MMHG | WEIGHT: 183 LBS | DIASTOLIC BLOOD PRESSURE: 80 MMHG | HEART RATE: 93 BPM | TEMPERATURE: 97.4 F | HEIGHT: 66 IN

## 2024-10-14 PROCEDURE — 99214 OFFICE O/P EST MOD 30 MIN: CPT | Mod: 25

## 2024-10-14 PROCEDURE — 31575 DIAGNOSTIC LARYNGOSCOPY: CPT | Mod: 78

## 2024-12-16 ENCOUNTER — APPOINTMENT (OUTPATIENT)
Dept: OTOLARYNGOLOGY | Facility: CLINIC | Age: 24
End: 2024-12-16
Payer: MEDICAID

## 2024-12-16 VITALS
DIASTOLIC BLOOD PRESSURE: 79 MMHG | HEART RATE: 79 BPM | HEIGHT: 66 IN | SYSTOLIC BLOOD PRESSURE: 119 MMHG | TEMPERATURE: 96.8 F | WEIGHT: 187 LBS | BODY MASS INDEX: 30.05 KG/M2

## 2024-12-16 PROCEDURE — 99214 OFFICE O/P EST MOD 30 MIN: CPT | Mod: 25

## 2024-12-16 PROCEDURE — 31575 DIAGNOSTIC LARYNGOSCOPY: CPT

## 2025-01-15 ENCOUNTER — APPOINTMENT (OUTPATIENT)
Dept: ENDOCRINOLOGY | Facility: CLINIC | Age: 25
End: 2025-01-15
Payer: MEDICAID

## 2025-01-15 ENCOUNTER — RESULT REVIEW (OUTPATIENT)
Age: 25
End: 2025-01-15

## 2025-01-15 ENCOUNTER — LABORATORY RESULT (OUTPATIENT)
Age: 25
End: 2025-01-15

## 2025-01-15 VITALS — HEART RATE: 108 BPM | WEIGHT: 189 LBS | SYSTOLIC BLOOD PRESSURE: 150 MMHG | DIASTOLIC BLOOD PRESSURE: 97 MMHG

## 2025-01-15 DIAGNOSIS — E89.0 POSTPROCEDURAL HYPOTHYROIDISM: ICD-10-CM

## 2025-01-15 LAB
ALBUMIN SERPL ELPH-MCNC: 4.3 G/DL
ALP BLD-CCNC: 98 U/L
ALT SERPL-CCNC: 11 U/L
ANION GAP SERPL CALC-SCNC: 11 MMOL/L
AST SERPL-CCNC: 13 U/L
BILIRUB SERPL-MCNC: 0.3 MG/DL
BUN SERPL-MCNC: 13 MG/DL
CALCIUM SERPL-MCNC: 9.4 MG/DL
CHLORIDE SERPL-SCNC: 100 MMOL/L
CO2 SERPL-SCNC: 26 MMOL/L
CREAT SERPL-MCNC: 0.85 MG/DL
EGFR: 99 ML/MIN/1.73M2
GLUCOSE SERPL-MCNC: 87 MG/DL
POTASSIUM SERPL-SCNC: 4 MMOL/L
PROT SERPL-MCNC: 7.4 G/DL
SODIUM SERPL-SCNC: 137 MMOL/L
T4 FREE SERPL-MCNC: 1.5 NG/DL
THYROGLOB AB SERPL-ACNC: 15.3 IU/ML
THYROGLOB SERPL-MCNC: 0.49 NG/ML
TSH SERPL-ACNC: 4.59 UIU/ML

## 2025-01-15 PROCEDURE — G2211 COMPLEX E/M VISIT ADD ON: CPT | Mod: NC

## 2025-01-15 PROCEDURE — 99215 OFFICE O/P EST HI 40 MIN: CPT

## 2025-01-17 ENCOUNTER — APPOINTMENT (OUTPATIENT)
Dept: ULTRASOUND IMAGING | Facility: HOSPITAL | Age: 25
End: 2025-01-17
Payer: MEDICAID

## 2025-01-17 ENCOUNTER — OUTPATIENT (OUTPATIENT)
Dept: OUTPATIENT SERVICES | Facility: HOSPITAL | Age: 25
LOS: 1 days | End: 2025-01-17

## 2025-01-17 DIAGNOSIS — Z90.89 ACQUIRED ABSENCE OF OTHER ORGANS: Chronic | ICD-10-CM

## 2025-01-17 PROCEDURE — 76536 US EXAM OF HEAD AND NECK: CPT | Mod: 26

## 2025-01-17 PROCEDURE — 76536 US EXAM OF HEAD AND NECK: CPT

## 2025-02-03 ENCOUNTER — APPOINTMENT (OUTPATIENT)
Dept: INTERNAL MEDICINE | Facility: CLINIC | Age: 25
End: 2025-02-03
Payer: MEDICAID

## 2025-02-03 VITALS
WEIGHT: 185 LBS | BODY MASS INDEX: 29.73 KG/M2 | OXYGEN SATURATION: 98 % | SYSTOLIC BLOOD PRESSURE: 127 MMHG | DIASTOLIC BLOOD PRESSURE: 83 MMHG | HEIGHT: 66 IN | TEMPERATURE: 98.1 F | HEART RATE: 82 BPM

## 2025-02-03 DIAGNOSIS — R51.9 HEADACHE, UNSPECIFIED: ICD-10-CM

## 2025-02-03 DIAGNOSIS — Z12.72 ENCOUNTER FOR SCREENING FOR MALIGNANT NEOPLASM OF VAGINA: ICD-10-CM

## 2025-02-03 PROCEDURE — 99213 OFFICE O/P EST LOW 20 MIN: CPT | Mod: 25

## 2025-02-06 ENCOUNTER — TRANSCRIPTION ENCOUNTER (OUTPATIENT)
Age: 25
End: 2025-02-06

## 2025-02-06 LAB — CYTOLOGY CVX/VAG DOC THIN PREP: NORMAL

## 2025-02-18 ENCOUNTER — APPOINTMENT (OUTPATIENT)
Dept: ENDOCRINOLOGY | Facility: CLINIC | Age: 25
End: 2025-02-18
Payer: MEDICAID

## 2025-02-18 VITALS — HEART RATE: 81 BPM | DIASTOLIC BLOOD PRESSURE: 82 MMHG | SYSTOLIC BLOOD PRESSURE: 138 MMHG | WEIGHT: 188 LBS

## 2025-02-18 DIAGNOSIS — E04.2 NONTOXIC MULTINODULAR GOITER: ICD-10-CM

## 2025-02-18 DIAGNOSIS — E89.0 POSTPROCEDURAL HYPOTHYROIDISM: ICD-10-CM

## 2025-02-18 PROCEDURE — 99214 OFFICE O/P EST MOD 30 MIN: CPT

## 2025-04-16 ENCOUNTER — APPOINTMENT (OUTPATIENT)
Dept: OTOLARYNGOLOGY | Facility: CLINIC | Age: 25
End: 2025-04-16
Payer: MEDICAID

## 2025-04-16 ENCOUNTER — NON-APPOINTMENT (OUTPATIENT)
Age: 25
End: 2025-04-16

## 2025-04-16 VITALS
DIASTOLIC BLOOD PRESSURE: 82 MMHG | WEIGHT: 188 LBS | TEMPERATURE: 96.5 F | HEART RATE: 81 BPM | SYSTOLIC BLOOD PRESSURE: 126 MMHG | BODY MASS INDEX: 30.22 KG/M2 | HEIGHT: 66 IN

## 2025-04-16 PROCEDURE — 31575 DIAGNOSTIC LARYNGOSCOPY: CPT

## 2025-04-16 PROCEDURE — 99214 OFFICE O/P EST MOD 30 MIN: CPT | Mod: 25

## 2025-09-08 ENCOUNTER — APPOINTMENT (OUTPATIENT)
Dept: INTERNAL MEDICINE | Facility: CLINIC | Age: 25
End: 2025-09-08
Payer: COMMERCIAL

## 2025-09-08 VITALS
OXYGEN SATURATION: 97 % | WEIGHT: 182 LBS | BODY MASS INDEX: 29.25 KG/M2 | HEART RATE: 81 BPM | TEMPERATURE: 97.6 F | SYSTOLIC BLOOD PRESSURE: 120 MMHG | HEIGHT: 66 IN | DIASTOLIC BLOOD PRESSURE: 84 MMHG

## 2025-09-08 DIAGNOSIS — Z85.850 PERSONAL HISTORY OF MALIGNANT NEOPLASM OF THYROID: ICD-10-CM

## 2025-09-08 DIAGNOSIS — C73 MALIGNANT NEOPLASM OF THYROID GLAND: ICD-10-CM

## 2025-09-08 DIAGNOSIS — Z00.00 ENCOUNTER FOR GENERAL ADULT MEDICAL EXAMINATION W/OUT ABNORMAL FINDINGS: ICD-10-CM

## 2025-09-08 DIAGNOSIS — R07.9 CHEST PAIN, UNSPECIFIED: ICD-10-CM

## 2025-09-08 PROCEDURE — 99213 OFFICE O/P EST LOW 20 MIN: CPT | Mod: 25,GC

## 2025-09-08 PROCEDURE — 36415 COLL VENOUS BLD VENIPUNCTURE: CPT

## 2025-09-08 PROCEDURE — 99395 PREV VISIT EST AGE 18-39: CPT | Mod: 25

## 2025-09-08 PROCEDURE — G0008: CPT

## 2025-09-08 PROCEDURE — 90656 IIV3 VACC NO PRSV 0.5 ML IM: CPT

## 2025-09-09 ENCOUNTER — NON-APPOINTMENT (OUTPATIENT)
Age: 25
End: 2025-09-09

## 2025-09-09 LAB
CHOLEST SERPL-MCNC: 187 MG/DL
HDLC SERPL-MCNC: 49 MG/DL
LDLC SERPL-MCNC: 124 MG/DL
NONHDLC SERPL-MCNC: 138 MG/DL
TRIGL SERPL-MCNC: 76 MG/DL
TSH SERPL-ACNC: 1.61 UIU/ML

## 2025-09-10 PROBLEM — Z85.850 HISTORY OF THYROID CANCER: Status: ACTIVE | Noted: 2025-09-10

## (undated) DEVICE — NERVE LOCATOR  III

## (undated) DEVICE — DRSG DERMABOND 0.7ML

## (undated) DEVICE — DRSG STERISTRIPS 0.5 X 4"

## (undated) DEVICE — DRAIN JACKSON PRATT 10MM FLAT 3/4 NO TROCAR

## (undated) DEVICE — PREP BETADINE KIT

## (undated) DEVICE — SUT MONOCRYL 4-0 27" PS-2 UNDYED

## (undated) DEVICE — SUT SILK 2-0 18" TIES

## (undated) DEVICE — DRAIN JACKSON PRATT 7MM FLAT FULL NO TROCAR

## (undated) DEVICE — ELCTR STRYKER NEPTUNE SMOKE EVACUATION PENCIL (GREEN)

## (undated) DEVICE — SUT CHROMIC 4-0 27" SH

## (undated) DEVICE — PACK UPPER BODY

## (undated) DEVICE — LONE STAR RETRACTOR RING 12MM BLUNT DISP

## (undated) DEVICE — GLV 7.5 PROTEXIS (WHITE)

## (undated) DEVICE — DRAPE TOWEL BLUE 17" X 24"

## (undated) DEVICE — DRAIN SILICONE ROUND 9FR

## (undated) DEVICE — BIPOLAR FORCEP KIRWAN JEWELERS STR 4" X 0.4MM W 12FT CORD (GREEN)

## (undated) DEVICE — SUT PROLENE 4-0 18" PS-2

## (undated) DEVICE — DRSG TEGADERM 4X4.75"

## (undated) DEVICE — SUT VICRYL 3-0 27" SH

## (undated) DEVICE — DRSG TEGADERM 2.5X3"

## (undated) DEVICE — SUCTION YANKAUER BULBOUS TIP W VENT

## (undated) DEVICE — SPONGE PEANUT AUTO COUNT

## (undated) DEVICE — SUT CHROMIC 3-0 27" SH

## (undated) DEVICE — DRAPE MAGNETIC INSTRUMENT MEDIUM

## (undated) DEVICE — DRAIN RESERVOIR FOR JACKSON PRATT 100CC CARDINAL

## (undated) DEVICE — SUT SILK 3-0 18" TIES

## (undated) DEVICE — RUBBERBAND STRL LTX FR 200/CA 3X1/8IN